# Patient Record
Sex: MALE | Race: WHITE | URBAN - METROPOLITAN AREA
[De-identification: names, ages, dates, MRNs, and addresses within clinical notes are randomized per-mention and may not be internally consistent; named-entity substitution may affect disease eponyms.]

---

## 2017-07-17 ENCOUNTER — OFFICE VISIT (OUTPATIENT)
Dept: FAMILY MEDICINE | Facility: CLINIC | Age: 32
End: 2017-07-17
Payer: COMMERCIAL

## 2017-07-17 ENCOUNTER — RADIANT APPOINTMENT (OUTPATIENT)
Dept: GENERAL RADIOLOGY | Facility: CLINIC | Age: 32
End: 2017-07-17
Attending: FAMILY MEDICINE
Payer: COMMERCIAL

## 2017-07-17 VITALS
SYSTOLIC BLOOD PRESSURE: 134 MMHG | WEIGHT: 193.5 LBS | BODY MASS INDEX: 28.66 KG/M2 | OXYGEN SATURATION: 97 % | TEMPERATURE: 97.7 F | RESPIRATION RATE: 16 BRPM | HEART RATE: 83 BPM | DIASTOLIC BLOOD PRESSURE: 86 MMHG | HEIGHT: 69 IN

## 2017-07-17 DIAGNOSIS — M25.532 LEFT WRIST PAIN: Primary | ICD-10-CM

## 2017-07-17 DIAGNOSIS — M25.532 LEFT WRIST PAIN: ICD-10-CM

## 2017-07-17 PROCEDURE — 73110 X-RAY EXAM OF WRIST: CPT | Mod: LT

## 2017-07-17 PROCEDURE — 99203 OFFICE O/P NEW LOW 30 MIN: CPT | Performed by: FAMILY MEDICINE

## 2017-07-17 NOTE — NURSING NOTE
"No chief complaint on file.      Initial /86 (BP Location: Left arm, Patient Position: Chair, Cuff Size: Adult Regular)  Pulse 83  Temp 97.7  F (36.5  C) (Tympanic)  Resp 16  Ht 5' 9\" (1.753 m)  Wt 193 lb 8 oz (87.8 kg)  SpO2 97%  BMI 28.57 kg/m2 Estimated body mass index is 28.57 kg/(m^2) as calculated from the following:    Height as of this encounter: 5' 9\" (1.753 m).    Weight as of this encounter: 193 lb 8 oz (87.8 kg).  Medication Reconciliation: complete     Princess ALIA Grace CMA      "

## 2017-07-17 NOTE — PROGRESS NOTES
"  SUBJECTIVE:                                                    Jurgen Chaney is a 31 year old male who presents to clinic today for the following health issues:    Musculoskeletal problem/pain      Duration: x 2 days    Description  Location: LT wrist. [place]15[place]17 by a group of 15 individuals.  He fell landing with full weight on Lt wrist     Intensity:  moderate    Accompanying signs and symptoms: radiation of pain to LT forearm and swelling    History  Previous similar problem: no   Previous evaluation:  none    Precipitating or alleviating factors:  Trauma or overuse: YES trauma.  Aggravating factors include: making a fist, bending    Therapies tried and outcome: ice          Problem list and histories reviewed & adjusted, as indicated.  Additional history: as documented    Labs reviewed in EPIC    Reviewed and updated as needed this visit by clinical staff       Reviewed and updated as needed this visit by Provider         ROS:  CONSTITUTIONAL:NEGATIVE for fever, chills, change in weight  MUSCULOSKELETAL: POSITIVE  for injury to Lt wrist    OBJECTIVE:                                                    /86 (BP Location: Left arm, Patient Position: Chair, Cuff Size: Adult Regular)  Pulse 83  Temp 97.7  F (36.5  C) (Tympanic)  Resp 16  Ht 5' 9\" (1.753 m)  Wt 193 lb 8 oz (87.8 kg)  SpO2 97%  BMI 28.57 kg/m2  Body mass index is 28.57 kg/(m^2).  GENERAL APPEARANCE: healthy, alert and no distress  MS: extremities normal- no gross deformities noted  ORTHO: Wrist Exam: WRIST:  Inspection: swelling slight over dorsum  Palpation: Tender: extensor tendons, flexor tendons  Non-tender: distal radius, distal ulna  Range of Motion: flexion:  full, painful, extension:  full, painful  Strength: no deficits  Special tests: negative Tinel's at carpal tunnel.      ELBOW:  elbow exam : Inspection: no swelling, ecchymosis  Tender: none  Non-tender: normal exam  Range of Motion: all normal  Strength: " elbow strength full  Special tests: normal stability:         Diagnostic test results:  Xray - Lt wrist: wnl, no Fx seen.  I viewed xray myself, radiology report pending        ASSESSMENT/PLAN:                                                        ICD-10-CM    1. Left wrist pain M25.532 XR Wrist Left G/E 3 Views       Follow up with Provider - as needed    Patient Instructions   Alternate ice & heat.  Use Ice massage on trigger point areas.  Do gentle Range of motion exercises  Ibuprofen as needed      Remington Gonzalez MD  [facility]

## 2017-07-17 NOTE — PATIENT INSTRUCTIONS
Alternate ice & heat.  Use Ice massage on trigger point areas.  Do gentle Range of motion exercises  Ibuprofen as needed

## 2017-07-17 NOTE — MR AVS SNAPSHOT
"              After Visit Summary   2017    Jurgen Chaney    MRN: [id number]           Patient Information     Date Of Birth          1985        Visit Information        Provider Department      2017 3:45 PM Remington Gonzalez MD [facility]        Today's Diagnoses     Left wrist pain    -  1      Care Instructions    Alternate ice & heat.  Use Ice massage on trigger point areas.  Do gentle Range of motion exercises  Ibuprofen as needed          Follow-ups after your visit        Who to contact     If you have questions or need follow up information about today's clinic visit or your schedule please contact [facility] directly at [phone].  Normal or non-critical lab and imaging results will be communicated to you by MyChart, letter or phone within 4 business days after the clinic has received the results. If you do not hear from us within 7 days, please contact the clinic through MyChart or phone. If you have a critical or abnormal lab result, we will notify you by phone as soon as possible.  Submit refill requests through [employer] or call your pharmacy and they will forward the refill request to us. Please allow 3 business days for your refill to be completed.          Additional Information About Your Visit        MyChart Information     [employer] lets you send messages to your doctor, view your test results, renew your prescriptions, schedule appointments and more. To sign up, go to [email or web address] . Click on \"Log in\" on the left side of the screen, which will take you to the Welcome page. Then click on \"Sign up Now\" on the right side of the page.     You will be asked to enter the access code listed below, as well as some personal information. Please follow the directions to create your username and password.     Your access code is: [email or web address]  Expires: 10/15/2017  4:00 PM     Your access code will  in 90 days. If " "you need help or a new code, please call your [place] clinic or [phone].        Care EveryWhere ID     This is your Care EveryWhere ID. This could be used by other organizations to access your [place] medical records  [id number]        Your Vitals Were     Pulse Temperature Respirations Height Pulse Oximetry BMI (Body Mass Index)    83 97.7  F (36.5  C) (Tympanic) 16 5' 9\" (1.753 m) 97% 28.57 kg/m2       Blood Pressure from Last 3 Encounters:   07/17/17 134/86   09/20/13 118/72    Weight from Last 3 Encounters:   07/17/17 193 lb 8 oz (87.8 kg)   09/20/13 189 lb 8 oz (86 kg)               Primary Care Provider Office Phone # Fax #    Devin Ramirez [phone]       [facility] [address] [facility] [address]        Equal Access to Services     SIMEON BAIRD AH: Hadii aad ku hadasho Soomaali, waaxda luqadaha, qaybta kaalmada adeegyada, waxay idiin hayaan esmer desouza . So [facility] [phone].    ATENCIÓN: Si habla español, tiene a stallworth disposición servicios gratuitos de asistencia lingüística. Llame al [phone].    We comply with applicable federal civil rights laws and [place] laws. We do not discriminate on the basis of race, color, national origin, age, disability sex, sexual orientation or gender identity.            Thank you!     Thank you for choosing [facility]  for your care. Our goal is always to provide you with excellent care. Hearing back from our patients is one way we can continue to improve our services. Please take a few minutes to complete the written survey that you may receive in the mail after your visit with us. Thank you!             Your Updated Medication List - Protect others around you: Learn how to safely use, store and throw away your medicines at www.disposemymeds.org.      Notice  As of 7/17/2017  4:00 PM    You have not been prescribed any medications.      "

## 2025-06-12 ENCOUNTER — OFFICE VISIT (OUTPATIENT)
Dept: FAMILY MEDICINE | Facility: CLINIC | Age: 40
End: 2025-06-12
Payer: COMMERCIAL

## 2025-06-12 VITALS
WEIGHT: 229.5 LBS | OXYGEN SATURATION: 100 % | TEMPERATURE: 97.6 F | HEART RATE: 77 BPM | BODY MASS INDEX: 32.86 KG/M2 | DIASTOLIC BLOOD PRESSURE: 96 MMHG | RESPIRATION RATE: 16 BRPM | HEIGHT: 70 IN | SYSTOLIC BLOOD PRESSURE: 147 MMHG

## 2025-06-12 DIAGNOSIS — Z11.4 SCREENING FOR HIV (HUMAN IMMUNODEFICIENCY VIRUS): ICD-10-CM

## 2025-06-12 DIAGNOSIS — Z13.6 ENCOUNTER FOR LIPID SCREENING FOR CARDIOVASCULAR DISEASE: ICD-10-CM

## 2025-06-12 DIAGNOSIS — Z71.3 WEIGHT LOSS COUNSELING, ENCOUNTER FOR: ICD-10-CM

## 2025-06-12 DIAGNOSIS — Z13.1 SCREENING FOR DIABETES MELLITUS: ICD-10-CM

## 2025-06-12 DIAGNOSIS — Z13.220 ENCOUNTER FOR LIPID SCREENING FOR CARDIOVASCULAR DISEASE: ICD-10-CM

## 2025-06-12 DIAGNOSIS — Z11.59 NEED FOR HEPATITIS C SCREENING TEST: ICD-10-CM

## 2025-06-12 DIAGNOSIS — Z23 NEED FOR VIRAL IMMUNIZATION: ICD-10-CM

## 2025-06-12 DIAGNOSIS — Z00.00 WELL ADULT EXAM: Primary | ICD-10-CM

## 2025-06-12 DIAGNOSIS — R73.9 HYPERGLYCEMIA: ICD-10-CM

## 2025-06-12 LAB
ALBUMIN SERPL BCG-MCNC: 4.4 G/DL (ref 3.5–5.2)
ALP SERPL-CCNC: 64 U/L (ref 40–150)
ALT SERPL W P-5'-P-CCNC: 60 U/L (ref 0–70)
ANION GAP SERPL CALCULATED.3IONS-SCNC: 13 MMOL/L (ref 7–15)
AST SERPL W P-5'-P-CCNC: 38 U/L (ref 0–45)
BILIRUB SERPL-MCNC: 0.3 MG/DL
BUN SERPL-MCNC: 12.1 MG/DL (ref 6–20)
CALCIUM SERPL-MCNC: 9.3 MG/DL (ref 8.8–10.4)
CHLORIDE SERPL-SCNC: 104 MMOL/L (ref 98–107)
CHOLEST SERPL-MCNC: 300 MG/DL
CREAT SERPL-MCNC: 0.8 MG/DL (ref 0.67–1.17)
EGFRCR SERPLBLD CKD-EPI 2021: >90 ML/MIN/1.73M2
EST. AVERAGE GLUCOSE BLD GHB EST-MCNC: 108 MG/DL
FASTING STATUS PATIENT QL REPORTED: YES
FASTING STATUS PATIENT QL REPORTED: YES
GLUCOSE SERPL-MCNC: 118 MG/DL (ref 70–99)
HBA1C MFR BLD: 5.4 % (ref 0–5.6)
HCO3 SERPL-SCNC: 24 MMOL/L (ref 22–29)
HDLC SERPL-MCNC: 52 MG/DL
LDLC SERPL CALC-MCNC: ABNORMAL MG/DL
LDLC SERPL DIRECT ASSAY-MCNC: 180 MG/DL
NONHDLC SERPL-MCNC: 248 MG/DL
POTASSIUM SERPL-SCNC: 4.3 MMOL/L (ref 3.4–5.3)
PROT SERPL-MCNC: 7.2 G/DL (ref 6.4–8.3)
SODIUM SERPL-SCNC: 141 MMOL/L (ref 135–145)
TRIGL SERPL-MCNC: 581 MG/DL

## 2025-06-12 PROCEDURE — 83721 ASSAY OF BLOOD LIPOPROTEIN: CPT | Mod: 59 | Performed by: FAMILY MEDICINE

## 2025-06-12 PROCEDURE — 80053 COMPREHEN METABOLIC PANEL: CPT | Performed by: FAMILY MEDICINE

## 2025-06-12 PROCEDURE — 90715 TDAP VACCINE 7 YRS/> IM: CPT | Performed by: FAMILY MEDICINE

## 2025-06-12 PROCEDURE — 1126F AMNT PAIN NOTED NONE PRSNT: CPT | Performed by: FAMILY MEDICINE

## 2025-06-12 PROCEDURE — 3077F SYST BP >= 140 MM HG: CPT | Performed by: FAMILY MEDICINE

## 2025-06-12 PROCEDURE — 3044F HG A1C LEVEL LT 7.0%: CPT | Performed by: FAMILY MEDICINE

## 2025-06-12 PROCEDURE — 3080F DIAST BP >= 90 MM HG: CPT | Performed by: FAMILY MEDICINE

## 2025-06-12 PROCEDURE — 80061 LIPID PANEL: CPT | Performed by: FAMILY MEDICINE

## 2025-06-12 PROCEDURE — 90480 ADMN SARSCOV2 VAC 1/ONLY CMP: CPT | Performed by: FAMILY MEDICINE

## 2025-06-12 PROCEDURE — 36415 COLL VENOUS BLD VENIPUNCTURE: CPT | Performed by: FAMILY MEDICINE

## 2025-06-12 PROCEDURE — 99385 PREV VISIT NEW AGE 18-39: CPT | Mod: 25 | Performed by: FAMILY MEDICINE

## 2025-06-12 PROCEDURE — 83036 HEMOGLOBIN GLYCOSYLATED A1C: CPT | Performed by: FAMILY MEDICINE

## 2025-06-12 PROCEDURE — 91320 SARSCV2 VAC 30MCG TRS-SUC IM: CPT | Performed by: FAMILY MEDICINE

## 2025-06-12 PROCEDURE — 90471 IMMUNIZATION ADMIN: CPT | Performed by: FAMILY MEDICINE

## 2025-06-12 PROCEDURE — 3050F LDL-C >= 130 MG/DL: CPT | Performed by: FAMILY MEDICINE

## 2025-06-12 PROCEDURE — 99213 OFFICE O/P EST LOW 20 MIN: CPT | Mod: 25 | Performed by: FAMILY MEDICINE

## 2025-06-12 SDOH — HEALTH STABILITY: PHYSICAL HEALTH: ON AVERAGE, HOW MANY MINUTES DO YOU ENGAGE IN EXERCISE AT THIS LEVEL?: 90 MIN

## 2025-06-12 SDOH — HEALTH STABILITY: PHYSICAL HEALTH: ON AVERAGE, HOW MANY DAYS PER WEEK DO YOU ENGAGE IN MODERATE TO STRENUOUS EXERCISE (LIKE A BRISK WALK)?: 5 DAYS

## 2025-06-12 ASSESSMENT — SOCIAL DETERMINANTS OF HEALTH (SDOH): HOW OFTEN DO YOU GET TOGETHER WITH FRIENDS OR RELATIVES?: ONCE A WEEK

## 2025-06-12 ASSESSMENT — PAIN SCALES - GENERAL: PAINLEVEL_OUTOF10: NO PAIN (0)

## 2025-06-12 NOTE — PATIENT INSTRUCTIONS
Jurgen    It was a pleasure seeing you in clinic today.  Here's the plan:    Notify me with home blood pressure readings  Follow-up within the next 3 months to discuss GLP-1 progress    Let me know if you have questions.    Johnny Rose MD

## 2025-06-12 NOTE — PROGRESS NOTES
"Preventive Care Visit  [facility] [place] [facility] FRICritical access hospitalJOSE Rose MD, Family Medicine  Jun 12, 2025      Assessment & Plan       ICD-10-CM    1. Well adult exam  Z00.00       2. Screening for diabetes mellitus  Z13.1       3. Screening for HIV (human immunodeficiency virus)  Z11.4       4. Need for hepatitis C screening test  Z11.59       5. Need for viral immunization  Z23 TDAP 10-64Y (ADACEL,BOOSTRIX)     COVID-19 12+ (PFIZER)      6. Encounter for lipid screening for cardiovascular disease  Z13.220 Lipid panel reflex to direct LDL Fasting    Z13.6 Lipid panel reflex to direct LDL Fasting      7. Hyperglycemia  R73.9 Comprehensive metabolic panel     Hemoglobin A1c     Comprehensive metabolic panel     Hemoglobin A1c      8. BMI 32.0-32.9,adult  Z68.32 tirzepatide-Weight Management (ZEPBOUND) 2.5 MG/0.5ML prefilled pen      9. Weight loss counseling, encounter for  Z71.3 tirzepatide-Weight Management (ZEPBOUND) 2.5 MG/0.5ML prefilled pen        Start GLP-1 - follow-up in 1-2 months    Patient has been advised of split billing requirements and indicates understanding: Yes        BMI  Estimated body mass index is 32.93 kg/m  as calculated from the following:    Height as of this encounter: 1.778 m (5' 10\").    Weight as of this encounter: 104.1 kg (229 lb 8 oz).   Weight management plan: Discussed healthy diet and exercise guidelines    Counseling  Appropriate preventive services were addressed with this patient via screening, questionnaire, or discussion as appropriate for fall prevention, nutrition, physical activity, Tobacco-use cessation, social engagement, weight loss and cognition.  Checklist reviewing preventive services available has been given to the patient.  Reviewed patient's diet, addressing concerns and/or questions.   The patient was instructed to see the dentist every 6 months.   He is at risk for psychosocial distress and has been provided with information to reduce risk. "           Pito Seaman is a 39 year old, presenting for the following:  Physical (Fasting)        6/12/2025     8:42 AM   Additional Questions   Roomed by Petty Main          HPI    BP Readings from Last 6 Encounters:   06/12/25 (!) 147/96   07/17/17 134/86   09/20/13 118/72     152/99  Has been trying to lose weight  Interested in GLP-1             Advance Care Planning    Discussed advance care planning with patient; however, patient declined at this time.        6/12/2025   General Health   How would you rate your overall physical health? (!) FAIR   Feel stress (tense, anxious, or unable to sleep) Very much   (!) STRESS CONCERN      6/12/2025   Nutrition   Three or more servings of calcium each day? Yes   Diet: Vegetarian/vegan   How many servings of fruit and vegetables per day? (!) 2-3   How many sweetened beverages each day? 0-1         6/12/2025   Exercise   Days per week of moderate/strenous exercise 5 days   Average minutes spent exercising at this level 90 min         6/12/2025   Social Factors   Frequency of gathering with friends or relatives Once a week   Worry food won't last until get money to buy more No   Food not last or not have enough money for food? No   Do you have housing? (Housing is defined as stable permanent housing and does not include staying outside in a car, in a tent, in an abandoned building, in an overnight shelter, or couch-surfing.) Yes   Are you worried about losing your housing? No   Lack of transportation? No   Unable to get utilities (heat,electricity)? No         6/12/2025   Dental   Dentist two times every year? (!) NO         Today's PHQ-2 Score:       6/12/2025     8:15 AM   PHQ-2 ( 1999 Pf[employer])   Q1: Little interest or pleasure in doing things 1   Q2: Feeling down, depressed or hopeless 1   PHQ-2 Score 2    Q1: Little interest or pleasure in doing things Several days   Q2: Feeling down, depressed or hopeless Several days   PHQ-2 Score 2       Patient-reported  "          6/12/2025   Substance Use   Alcohol more than 3/day or more than 7/wk No   Do you use any other substances recreationally? No     Social History     Tobacco Use    Smoking status: Former     Types: Cigarettes     Passive exposure: Never    Smokeless tobacco: Never    Tobacco comments:     never been an everyday smoker. Was only when having some alcohol   Vaping Use    Vaping status: Never Used   Substance Use Topics    Alcohol use: Yes     Alcohol/week: 6.7 standard drinks of alcohol     Types: 8 Standard drinks or equivalent per week     Comment: 1x/week.     Drug use: No             6/12/2025   One time HIV Screening   Previous HIV test? Yes         6/12/2025   STI Screening   New sexual partner(s) since last STI/HIV test? No         6/12/2025   Contraception/Family Planning   Questions about contraception or family planning No        Reviewed and updated as needed this visit by Provider                    BP Readings from Last 3 Encounters:   06/12/25 (!) 147/96   07/17/17 134/86   09/20/13 118/72    Wt Readings from Last 3 Encounters:   06/12/25 104.1 kg (229 lb 8 oz)   07/17/17 87.8 kg (193 lb 8 oz)   09/20/13 86 kg (189 lb 8 oz)                      Review of Systems  Constitutional, HEENT, cardiovascular, pulmonary, gi and gu systems are negative, except as otherwise noted.     Objective    Exam  BP (!) 151/100 (BP Location: Left arm, Patient Position: Chair, Cuff Size: Adult Large)   Pulse 83   Temp 97.6  F (36.4  C) (Temporal)   Resp 16   Ht 1.778 m (5' 10\")   Wt 104.1 kg (229 lb 8 oz)   SpO2 100%   BMI 32.93 kg/m     Estimated body mass index is 32.93 kg/m  as calculated from the following:    Height as of this encounter: 1.778 m (5' 10\").    Weight as of this encounter: 104.1 kg (229 lb 8 oz).    Physical Exam  Vitals reviewed.   Constitutional:       Appearance: Normal appearance. He is not ill-appearing.   HENT:      Head: Normocephalic.      Right Ear: Tympanic membrane and ear canal " normal.      Left Ear: Tympanic membrane and ear canal normal.      Nose: Nose normal.   Eyes:      Extraocular Movements: Extraocular movements intact.   Cardiovascular:      Rate and Rhythm: Normal rate and regular rhythm.      Heart sounds: Normal heart sounds. No murmur heard.  Pulmonary:      Effort: Pulmonary effort is normal. No respiratory distress.      Breath sounds: Normal breath sounds. No wheezing or rales.   Abdominal:      Palpations: Abdomen is soft.   Musculoskeletal:         General: Normal range of motion.      Cervical back: Normal range of motion and neck supple.   Skin:     General: Skin is warm and dry.      Findings: No lesion.   Neurological:      Mental Status: He is alert and oriented to person, place, and time.   Psychiatric:         Mood and Affect: Mood normal.         Behavior: Behavior normal.         Thought Content: Thought content normal.         Judgment: Judgment normal.               Signed Electronically by: Johnny Rose MD

## 2025-06-16 ENCOUNTER — RESULTS FOLLOW-UP (OUTPATIENT)
Dept: FAMILY MEDICINE | Facility: CLINIC | Age: 40
End: 2025-06-16
Payer: COMMERCIAL

## 2025-06-16 NOTE — RESULT ENCOUNTER NOTE
Attempt #1 to call patient.     RN left voicemail and requested return call to [facility] at #[phone]. When patient returns call, please review message from provider below.    Jennifer GIL RN  [facility]

## 2025-06-16 NOTE — TELEPHONE ENCOUNTER
Patient calling back about his lab result note.    Scheduled video visit with Dr. Arana.    Patient verbalized understanding of and agreement with plan.    Oralia DENNEY RN  [facility] Triage

## 2025-07-21 ENCOUNTER — VIRTUAL VISIT (OUTPATIENT)
Dept: FAMILY MEDICINE | Facility: CLINIC | Age: 40
End: 2025-07-21
Payer: COMMERCIAL

## 2025-07-21 DIAGNOSIS — E78.1 HYPERTRIGLYCERIDEMIA: ICD-10-CM

## 2025-07-21 DIAGNOSIS — E78.5 HYPERLIPIDEMIA LDL GOAL <100: ICD-10-CM

## 2025-07-21 DIAGNOSIS — Z71.3 WEIGHT LOSS COUNSELING, ENCOUNTER FOR: ICD-10-CM

## 2025-07-21 DIAGNOSIS — I10 BENIGN ESSENTIAL HYPERTENSION: Primary | ICD-10-CM

## 2025-07-21 PROCEDURE — 98006 SYNCH AUDIO-VIDEO EST MOD 30: CPT | Performed by: FAMILY MEDICINE

## 2025-07-21 RX ORDER — LISINOPRIL 10 MG/1
10 TABLET ORAL DAILY
Qty: 90 TABLET | Refills: 1 | Status: SHIPPED | OUTPATIENT
Start: 2025-07-21

## 2025-07-21 NOTE — PROGRESS NOTES
Jurgen is a 39 year old who is being evaluated via a billable video visit.    What phone number would you like to be contacted at? [phone]  How would you like to obtain your AVS? MyChart      Assessment & Plan       ICD-10-CM    1. Benign essential hypertension  I10 lisinopril (ZESTRIL) 10 MG tablet      2. Weight loss counseling, encounter for  Z71.3 tirzepatide-Weight Management (ZEPBOUND) 5 MG/0.5ML prefilled pen      3. BMI 32.0-32.9,adult  Z68.32 tirzepatide-Weight Management (ZEPBOUND) 5 MG/0.5ML prefilled pen      4. Hyperlipidemia LDL goal <100  E78.5         Recheck cholesterol in 6 months    The longitudinal plan of care for the diagnosis(es)/condition(s) as documented were addressed during this visit. Due to the added complexity in care, I will continue to support Jurgen in the subsequent management and with ongoing continuity of care.         Subjective   Jurgen is a 39 year old, presenting for the following health issues:  Recheck Medication    History of Present Illness       Hypertension: He presents for follow up of hypertension.  He does not check blood pressure  regularly outside of the clinic. Outside blood pressures have been over 140/90. He does not follow a low salt diet.     Reason for visit:  Medication zepbound-down 10 pounds    He eats 2-3 servings of fruits and vegetables daily.He consumes 0 sweetened beverage(s) daily.He exercises with enough effort to increase his heart rate 20 to 29 minutes per day.    He is taking medications regularly.      Wt Readings from Last 4 Encounters:   06/12/25 104.1 kg (229 lb 8 oz)   07/17/17 87.8 kg (193 lb 8 oz)   09/20/13 86 kg (189 lb 8 oz)     Has lost 10 lb in the last month since starting zepbound  Slight nausea, no side effects reported          Review of Systems  Constitutional, HEENT, cardiovascular, pulmonary, gi and gu systems are negative, except as otherwise noted.      Objective    Vitals - Patient Reported  Weight (Patient Reported):  98.4 kg (217 lb)      Vitals:  No vitals were obtained today due to virtual visit.    Physical Exam   GENERAL: alert and no distress  EYES: Eyes grossly normal to inspection.  No discharge or erythema, or obvious scleral/conjunctival abnormalities.  RESP: No audible wheeze, cough, or visible cyanosis.    SKIN: Visible skin clear. No significant rash, abnormal pigmentation or lesions.  NEURO: Cranial nerves grossly intact.  Mentation and speech appropriate for age.  PSYCH: Appropriate affect, tone, and pace of words          Video-Visit Details    Type of service:  Video Visit   Originating Location (pt. Location): Home    Distant Location (provider location):  On-site  Platform used for Video Visit: Claude  Signed Electronically by: Johnny Rose MD

## 2025-08-12 ENCOUNTER — MYC REFILL (OUTPATIENT)
Dept: FAMILY MEDICINE | Facility: CLINIC | Age: 40
End: 2025-08-12
Payer: COMMERCIAL

## 2025-08-12 DIAGNOSIS — Z71.3 WEIGHT LOSS COUNSELING, ENCOUNTER FOR: ICD-10-CM
